# Patient Record
Sex: FEMALE | URBAN - METROPOLITAN AREA
[De-identification: names, ages, dates, MRNs, and addresses within clinical notes are randomized per-mention and may not be internally consistent; named-entity substitution may affect disease eponyms.]

---

## 2021-02-12 ENCOUNTER — OFFICE VISIT (OUTPATIENT)
Dept: URBAN - METROPOLITAN AREA CLINIC 33 | Facility: CLINIC | Age: 71
End: 2021-02-12
Payer: MEDICARE

## 2021-02-12 DIAGNOSIS — H25.13 AGE-RELATED NUCLEAR CATARACT, BILATERAL: ICD-10-CM

## 2021-02-12 PROCEDURE — 92004 COMPRE OPH EXAM NEW PT 1/>: CPT | Performed by: OPHTHALMOLOGY

## 2021-02-12 ASSESSMENT — KERATOMETRY
OS: 45.75
OD: 45.25

## 2021-02-12 ASSESSMENT — INTRAOCULAR PRESSURE
OS: 14
OD: 14

## 2021-02-12 NOTE — IMPRESSION/PLAN
Impression: Conjunctivitis - Allergic: H10.413. Condition: quality of life issue. Symptoms: will treat with meds. Plan: Discussed diagnosis in detail with patient. Discussed treatment options with patient. Discussed risks of progression. New medication(s) Rx given today. FML BID OU. Will continue to observe condition and or symptoms. Patient instructed to call if condition gets worse.

## 2021-02-12 NOTE — IMPRESSION/PLAN
Impression: Age-related nuclear cataract, bilateral: H25.13. Plan: Discussed diagnosis in detail with patient. No treatment is required at this time. Discussed risks of progression. Will continue to observe condition and or symptoms. Call if [address] worsens.

## 2021-10-22 ENCOUNTER — OFFICE VISIT (OUTPATIENT)
Dept: URBAN - METROPOLITAN AREA CLINIC 33 | Facility: CLINIC | Age: 71
End: 2021-10-22
Payer: MEDICARE

## 2021-10-22 DIAGNOSIS — H10.413 CONJUNCTIVITIS - ALLERGIC: Primary | ICD-10-CM

## 2021-10-22 PROCEDURE — 99203 OFFICE O/P NEW LOW 30 MIN: CPT | Performed by: OPHTHALMOLOGY

## 2021-10-22 ASSESSMENT — KERATOMETRY
OS: 45.88
OD: 45.50

## 2021-10-22 ASSESSMENT — VISUAL ACUITY
OS: 20/25
OD: 20/20

## 2021-10-22 ASSESSMENT — INTRAOCULAR PRESSURE
OD: 12
OS: 12

## 2021-10-22 NOTE — IMPRESSION/PLAN
Impression: Conjunctivitis - Allergic: H10.413. Condition: improving. Symptoms: will continue to monitor. Plan: Discussed diagnosis in detail with patient. Discussed treatment options with patient. Patient instructed to use artificial tears as needed. Will continue to observe condition and or symptoms. Patient instructed to call if condition gets worse.

## 2022-03-28 ENCOUNTER — OFFICE VISIT (OUTPATIENT)
Dept: URBAN - METROPOLITAN AREA CLINIC 33 | Facility: CLINIC | Age: 72
End: 2022-03-28
Payer: MEDICARE

## 2022-03-28 PROCEDURE — 99214 OFFICE O/P EST MOD 30 MIN: CPT | Performed by: OPHTHALMOLOGY

## 2022-03-28 RX ORDER — PREDNISOLONE ACETATE 10 MG/ML
1 % SUSPENSION/ DROPS OPHTHALMIC
Qty: 5 | Refills: 3 | Status: ACTIVE
Start: 2022-03-28

## 2022-03-28 ASSESSMENT — KERATOMETRY
OD: 45.25
OS: 45.75

## 2022-03-28 NOTE — IMPRESSION/PLAN
Impression: Age-related nuclear cataract, bilateral: H25.13. Plan: Discussed diagnosis in detail with patient. No treatment is required at this time. Discussed risks of progression. Will continue to observe condition and or symptoms. OK to proceed with refraction and updated glasses prescription at patient convenience.

## 2022-03-28 NOTE — IMPRESSION/PLAN
Impression: Conjunctivitis - Allergic: H10.413. Condition: improving. Symptoms: will continue to monitor. Plan: Discussed diagnosis in detail with patient. Discussed treatment options with patient. Patient instructed to use artificial tears as needed. OK to use PF BID OU prn. Rx sent today. Will continue to observe condition and or symptoms. Patient instructed to call if condition gets worse.

## 2022-11-28 ENCOUNTER — OFFICE VISIT (OUTPATIENT)
Dept: URBAN - METROPOLITAN AREA CLINIC 33 | Facility: CLINIC | Age: 72
End: 2022-11-28
Payer: MEDICARE

## 2022-11-28 DIAGNOSIS — H16.223 KERATOCONJUNCTIVITIS SICCA, NOT SPECIFIED AS SJÖGREN'S, BILATERAL: Primary | ICD-10-CM

## 2022-11-28 PROCEDURE — 99213 OFFICE O/P EST LOW 20 MIN: CPT | Performed by: OPTOMETRIST

## 2022-11-28 ASSESSMENT — INTRAOCULAR PRESSURE
OS: 14
OD: 15

## 2023-03-03 ENCOUNTER — OFFICE VISIT (OUTPATIENT)
Dept: URBAN - METROPOLITAN AREA CLINIC 33 | Facility: CLINIC | Age: 73
End: 2023-03-03
Payer: MEDICARE

## 2023-03-03 DIAGNOSIS — H25.13 AGE-RELATED NUCLEAR CATARACT, BILATERAL: ICD-10-CM

## 2023-03-03 DIAGNOSIS — H43.813 VITREOUS DEGENERATION, BILATERAL: ICD-10-CM

## 2023-03-03 DIAGNOSIS — H16.223 KERATOCONJUNCTIVITIS SICCA, NOT SPECIFIED AS SJÖGREN'S, BILATERAL: Primary | ICD-10-CM

## 2023-03-03 PROCEDURE — 99214 OFFICE O/P EST MOD 30 MIN: CPT | Performed by: OPTOMETRIST

## 2023-03-03 ASSESSMENT — VISUAL ACUITY
OD: 20/30
OS: 20/25

## 2023-03-03 ASSESSMENT — INTRAOCULAR PRESSURE
OS: 10
OD: 11

## 2023-03-03 ASSESSMENT — KERATOMETRY
OD: 45.25
OS: 45.75

## 2023-03-03 NOTE — IMPRESSION/PLAN
Impression: Age-related nuclear cataract, bilateral: H25.13. Plan: Patient is generally satisfied with vision. Discussed presence of cataracts but not ready for surgical intervention.

## 2023-03-03 NOTE — IMPRESSION/PLAN
Impression: Vitreous degeneration, bilateral: H43.813. Plan: Vitreous floaters are long-standing and well tolerated. Retina is flat. No need for treatment.

## 2024-03-22 ENCOUNTER — OFFICE VISIT (OUTPATIENT)
Dept: URBAN - METROPOLITAN AREA CLINIC 33 | Facility: CLINIC | Age: 74
End: 2024-03-22
Payer: MEDICARE

## 2024-03-22 DIAGNOSIS — H16.223 KERATOCONJUNCTIVITIS SICCA, NOT SPECIFIED AS SJ”GREN'S, BILATERAL: Primary | ICD-10-CM

## 2024-03-22 PROCEDURE — 99213 OFFICE O/P EST LOW 20 MIN: CPT | Performed by: OPHTHALMOLOGY

## 2024-03-22 ASSESSMENT — KERATOMETRY
OD: 45.50
OS: 45.88

## 2024-03-22 ASSESSMENT — VISUAL ACUITY
OS: 20/30
OD: 20/30

## 2024-03-22 ASSESSMENT — INTRAOCULAR PRESSURE
OS: 11
OD: 11

## 2025-03-28 ENCOUNTER — OFFICE VISIT (OUTPATIENT)
Dept: URBAN - METROPOLITAN AREA CLINIC 33 | Facility: CLINIC | Age: 75
End: 2025-03-28
Payer: MEDICARE

## 2025-03-28 DIAGNOSIS — H25.813 COMBINED FORMS OF AGE-RELATED CATARACT, BILATERAL: Primary | ICD-10-CM

## 2025-03-28 PROCEDURE — 99213 OFFICE O/P EST LOW 20 MIN: CPT | Performed by: OPHTHALMOLOGY

## 2025-03-28 ASSESSMENT — INTRAOCULAR PRESSURE
OD: 12
OS: 12

## 2025-03-28 ASSESSMENT — VISUAL ACUITY
OS: 20/40
OD: 20/40

## 2025-03-28 ASSESSMENT — KERATOMETRY
OD: 45.25
OS: 45.75

## 2025-08-28 ENCOUNTER — OFFICE VISIT (OUTPATIENT)
Dept: URBAN - METROPOLITAN AREA CLINIC 33 | Facility: CLINIC | Age: 75
End: 2025-08-28
Payer: MEDICARE

## 2025-08-28 DIAGNOSIS — H25.813 COMBINED FORMS OF AGE-RELATED CATARACT, BILATERAL: Primary | ICD-10-CM

## 2025-08-28 PROCEDURE — 99214 OFFICE O/P EST MOD 30 MIN: CPT

## 2025-08-28 ASSESSMENT — INTRAOCULAR PRESSURE
OD: 13
OS: 11

## 2025-08-28 ASSESSMENT — VISUAL ACUITY
OS: 20/40
OD: 20/30